# Patient Record
Sex: FEMALE | URBAN - METROPOLITAN AREA
[De-identification: names, ages, dates, MRNs, and addresses within clinical notes are randomized per-mention and may not be internally consistent; named-entity substitution may affect disease eponyms.]

---

## 2019-06-04 ENCOUNTER — APPOINTMENT (RX ONLY)
Dept: URBAN - METROPOLITAN AREA CLINIC 23 | Facility: CLINIC | Age: 39
Setting detail: DERMATOLOGY
End: 2019-06-04

## 2019-06-04 DIAGNOSIS — Z41.9 ENCOUNTER FOR PROCEDURE FOR PURPOSES OTHER THAN REMEDYING HEALTH STATE, UNSPECIFIED: ICD-10-CM

## 2019-06-04 PROCEDURE — ? COSMETIC CONSULTATION: COOLSCULPTING

## 2019-06-11 ENCOUNTER — APPOINTMENT (RX ONLY)
Dept: URBAN - METROPOLITAN AREA CLINIC 23 | Facility: CLINIC | Age: 39
Setting detail: DERMATOLOGY
End: 2019-06-11

## 2019-06-11 DIAGNOSIS — Z41.9 ENCOUNTER FOR PROCEDURE FOR PURPOSES OTHER THAN REMEDYING HEALTH STATE, UNSPECIFIED: ICD-10-CM

## 2019-06-11 PROCEDURE — ? COOLSCULPTING

## 2019-06-11 NOTE — PROCEDURE: COOLSCULPTING
Detail Level: Zone
Consent: Written consent obtained, risks reviewed including, but not limited to, blistering from suction, darker or lighter pigmentary change, bruising, and/or need for multiple treatments.
Applicator Size: [address]
Suction Settings: The suction settings were per protocol.
Applicator Size: [employer]Advantage Curve
Applicator Size: CoolAdvantage Petite
Intro: Prior to treatment, the area was cleaned with alcohol and marked out with a marking pen. The gel sheet was then applied uniformly. The applicator was applied to the skin with good contact and suction.
Applicator Size: CoolAdvantage Petite Core
Location 1: flank (right)
Location 2: lower abdomen (left)
Time (Minutes - Will Only Render If Nonzero): [address] [employer] [address]
Location 3: flank (left)
Time (Minutes - Will Only Render If Nonzero): 35
Device: Coolsculpting
Location 6: lower abdomen (right)
Post Treatment: After treatment, the suction was turned off, and the applicator was removed from the skin.
Location 4: upper abdomen (right)
Location 5: upper abdomen (left)
Patient Weight (Optional): [address]

## 2019-06-12 ENCOUNTER — APPOINTMENT (RX ONLY)
Dept: URBAN - METROPOLITAN AREA CLINIC 23 | Facility: CLINIC | Age: 39
Setting detail: DERMATOLOGY
End: 2019-06-12

## 2019-06-12 DIAGNOSIS — Z41.9 ENCOUNTER FOR PROCEDURE FOR PURPOSES OTHER THAN REMEDYING HEALTH STATE, UNSPECIFIED: ICD-10-CM

## 2019-06-12 PROCEDURE — ? VELASHAPE

## 2019-06-12 NOTE — PROCEDURE: VELASHAPE
Post-Care Instructions: I reviewed with the patient in detail post-care instructions. Patient should stay away from the sun and wear sun protection until treated areas are fully healed.
Anesthesia Volume In Cc: 0
Pre-Procedure Text: The treatment areas were then cleaned and a coupling gel was applied.
Pre-Procedures Photographs: Yes
Use Distraction Techniques In Note: No
Mode Used: Lumiy
Treatment Area: back of thighs
Anesthesia Type: 1% lidocaine with epinephrine
Post-Procedure: Procedure not performed because velashape machine was not operating properly. Yuni Mcgrath
Detail Level: Zone
Consent: Written consent obtained, risks reviewed including but not limited to crusting, scabbing, blistering, scarring, darker or lighter pigmentary change, and/or incomplete improvement.
Treatment Number (Optional): 1

## 2019-06-18 ENCOUNTER — APPOINTMENT (RX ONLY)
Dept: URBAN - METROPOLITAN AREA CLINIC 23 | Facility: CLINIC | Age: 39
Setting detail: DERMATOLOGY
End: 2019-06-18

## 2019-06-18 DIAGNOSIS — Z41.9 ENCOUNTER FOR PROCEDURE FOR PURPOSES OTHER THAN REMEDYING HEALTH STATE, UNSPECIFIED: ICD-10-CM

## 2019-06-18 PROCEDURE — ? VELASHAPE

## 2019-06-18 NOTE — PROCEDURE: VELASHAPE
Anesthesia Type: 1% lidocaine with epinephrine
Pre-Procedure Text: The treatment areas were then cleaned and a coupling gel was applied.
Treatment Area: back of thighs
Use Distraction Techniques In Note: No
Pre-Procedures Photographs: Yes
Post-Procedure: Procedure not performed because velashape machine was not operating properly. Kristyn Hurt
Detail Level: Zone
Treatment Number (Optional): 2
Mode Used: [employer]
Consent: Written consent obtained, risks reviewed including but not limited to crusting, scabbing, blistering, scarring, darker or lighter pigmentary change, and/or incomplete improvement.
Anesthesia Volume In Cc: 0
Post-Care Instructions: I reviewed with the patient in detail post-care instructions. Patient should stay away from the sun and wear sun protection until treated areas are fully healed.

## 2019-07-03 ENCOUNTER — APPOINTMENT (RX ONLY)
Dept: URBAN - METROPOLITAN AREA CLINIC 23 | Facility: CLINIC | Age: 39
Setting detail: DERMATOLOGY
End: 2019-07-03

## 2019-07-03 DIAGNOSIS — Z41.9 ENCOUNTER FOR PROCEDURE FOR PURPOSES OTHER THAN REMEDYING HEALTH STATE, UNSPECIFIED: ICD-10-CM

## 2019-07-03 PROCEDURE — ? VELASHAPE

## 2019-07-03 NOTE — PROCEDURE: VELASHAPE
Post-Procedure: Procedure not performed because velashape machine was not operating properly. Gianni Prudent
Mode Used: [employer]
Use Distraction Techniques In Note: No
Consent: Written consent obtained, risks reviewed including but not limited to crusting, scabbing, blistering, scarring, darker or lighter pigmentary change, and/or incomplete improvement.
Post-Care Instructions: I reviewed with the patient in detail post-care instructions. Patient should stay away from the sun and wear sun protection until treated areas are fully healed.
Detail Level: Zone
Treatment Area: back of thighs
Pre-Procedure Text: The treatment areas were then cleaned and a coupling gel was applied.
Anesthesia Volume In Cc: 0
Pre-Procedures Photographs: Yes
Treatment Number (Optional): 3
Anesthesia Type: 1% lidocaine with epinephrine

## 2019-07-09 ENCOUNTER — APPOINTMENT (RX ONLY)
Dept: URBAN - METROPOLITAN AREA CLINIC 23 | Facility: CLINIC | Age: 39
Setting detail: DERMATOLOGY
End: 2019-07-09

## 2019-07-09 DIAGNOSIS — Z41.9 ENCOUNTER FOR PROCEDURE FOR PURPOSES OTHER THAN REMEDYING HEALTH STATE, UNSPECIFIED: ICD-10-CM

## 2019-07-09 PROCEDURE — ? VELASHAPE

## 2019-07-09 NOTE — PROCEDURE: VELASHAPE
Detail Level: Zone
Pre-Procedure Text: The treatment areas were then cleaned and a coupling gel was applied.
Post-Care Instructions: I reviewed with the patient in detail post-care instructions. Patient should stay away from the sun and wear sun protection until treated areas are fully healed.
Treatment Area: back of thighs
Post-Procedures Photographs: No
Mode Used: [employer]
Anesthesia Volume In Cc: 0
Post-Procedure: Procedure not performed because velashape machine was not operating properly. Chito Malik
Treatment Number (Optional): 4
Pre-Procedures Photographs: Yes
Anesthesia Type: 1% lidocaine with epinephrine
Consent: Written consent obtained, risks reviewed including but not limited to crusting, scabbing, blistering, scarring, darker or lighter pigmentary change, and/or incomplete improvement.

## 2019-07-15 ENCOUNTER — APPOINTMENT (RX ONLY)
Dept: URBAN - METROPOLITAN AREA CLINIC 23 | Facility: CLINIC | Age: 39
Setting detail: DERMATOLOGY
End: 2019-07-15

## 2019-07-15 DIAGNOSIS — Z41.9 ENCOUNTER FOR PROCEDURE FOR PURPOSES OTHER THAN REMEDYING HEALTH STATE, UNSPECIFIED: ICD-10-CM

## 2019-07-15 PROCEDURE — ? VELASHAPE

## 2019-07-15 PROCEDURE — ? ULTRASHAPE

## 2019-07-15 NOTE — PROCEDURE: ULTRASHAPE
Anesthesia Type: 1% lidocaine with epinephrine
Post-Care Instructions: I reviewed with the patient in detail post-care instructions. Patient should stay away from the sun and wear sun protection until treated areas are fully healed.
Post-Procedures Photographs: No
Treatment Area: abdomen
Detail Level: Zone
Consent: Written consent obtained, risks reviewed including but not limited to crusting, scabbing, blistering, scarring, darker or lighter pigmentary change, and/or incomplete improvement.
Focal Treatment Zones (Optional): 5
Anesthesia Volume In Cc: 0
Post-Procedure: The patient tolerated the procedure well and post-care was reviewed.
Focal Treatment Zones (Optional): 6
Pre-Procedures Photographs: Yes
Pre-Procedure Text: The treatment areas were then cleaned and a coupling gel was applied.
Treatment Number (Optional): 1

## 2019-07-15 NOTE — PROCEDURE: VELASHAPE
Mode Used: [employer]
Post-Procedure: Procedure not performed because velashape machine was not operating properly. Kristyn Hurt
Treatment Number (Optional): 1
Pre-Procedures Photographs: Yes
Use Distraction Techniques In Note: No
Pre-Procedure Text: The treatment areas were then cleaned and a coupling gel was applied.
Anesthesia Type: 1% lidocaine with epinephrine
Detail Level: Zone
Post-Care Instructions: I reviewed with the patient in detail post-care instructions. Patient should stay away from the sun and wear sun protection until treated areas are fully healed.
Treatment Area: abdomen
Consent: Written consent obtained, risks reviewed including but not limited to crusting, scabbing, blistering, scarring, darker or lighter pigmentary change, and/or incomplete improvement.
Anesthesia Volume In Cc: 0

## 2019-07-17 ENCOUNTER — APPOINTMENT (RX ONLY)
Dept: URBAN - METROPOLITAN AREA CLINIC 23 | Facility: CLINIC | Age: 39
Setting detail: DERMATOLOGY
End: 2019-07-17

## 2019-07-17 DIAGNOSIS — Z41.9 ENCOUNTER FOR PROCEDURE FOR PURPOSES OTHER THAN REMEDYING HEALTH STATE, UNSPECIFIED: ICD-10-CM

## 2019-07-17 PROCEDURE — ? VELASHAPE

## 2019-07-17 NOTE — PROCEDURE: VELASHAPE
Mode Used: [employer]
Treatment Number (Optional): 2
Post-Care Instructions: I reviewed with the patient in detail post-care instructions. Patient should stay away from the sun and wear sun protection until treated areas are fully healed.
Pre-Procedures Photographs: Yes
Pre-Procedure Text: The treatment areas were then cleaned and a coupling gel was applied.
Anesthesia Volume In Cc: 0
Use Distraction Techniques In Note: No
Treatment Area: back of thighs
Detail Level: Zone
Post-Procedure: Procedure not performed because velashape machine was not operating properly. Natalie Marion
Anesthesia Type: 1% lidocaine with epinephrine
Consent: Written consent obtained, risks reviewed including but not limited to crusting, scabbing, blistering, scarring, darker or lighter pigmentary change, and/or incomplete improvement.

## 2019-07-23 ENCOUNTER — APPOINTMENT (RX ONLY)
Dept: URBAN - METROPOLITAN AREA CLINIC 23 | Facility: CLINIC | Age: 39
Setting detail: DERMATOLOGY
End: 2019-07-23

## 2019-07-23 DIAGNOSIS — Z41.9 ENCOUNTER FOR PROCEDURE FOR PURPOSES OTHER THAN REMEDYING HEALTH STATE, UNSPECIFIED: ICD-10-CM

## 2019-07-23 PROCEDURE — ? VELASHAPE

## 2019-07-23 NOTE — PROCEDURE: VELASHAPE
Consent: Written consent obtained, risks reviewed including but not limited to crusting, scabbing, blistering, scarring, darker or lighter pigmentary change, and/or incomplete improvement.
Anesthesia Volume In Cc: 0
Pre-Procedures Photographs: Yes
Treatment Area: back of thighs
Post-Procedures Photographs: No
Post-Care Instructions: I reviewed with the patient in detail post-care instructions. Patient should stay away from the sun and wear sun protection until treated areas are fully healed.
Anesthesia Type: 1% lidocaine with epinephrine
Pre-Procedure Text: The treatment areas were then cleaned and a coupling gel was applied.
Mode Used: [employer]
Detail Level: Zone
Treatment Number (Optional): 3
Post-Procedure: Procedure not performed because velashape machine was not operating properly. Reji Collins

## 2019-07-30 ENCOUNTER — APPOINTMENT (RX ONLY)
Dept: URBAN - METROPOLITAN AREA CLINIC 23 | Facility: CLINIC | Age: 39
Setting detail: DERMATOLOGY
End: 2019-07-30

## 2019-07-30 DIAGNOSIS — Z41.9 ENCOUNTER FOR PROCEDURE FOR PURPOSES OTHER THAN REMEDYING HEALTH STATE, UNSPECIFIED: ICD-10-CM

## 2019-07-30 PROCEDURE — ? ULTRASHAPE

## 2019-07-30 PROCEDURE — ? VELASHAPE

## 2019-07-30 NOTE — PROCEDURE: VELASHAPE
Consent: Written consent obtained, risks reviewed including but not limited to crusting, scabbing, blistering, scarring, darker or lighter pigmentary change, and/or incomplete improvement.
Mode Used: [employer]
Treatment Number (Optional): 4
Post-Procedures Photographs: No
Anesthesia Volume In Cc: 0
Post-Care Instructions: I reviewed with the patient in detail post-care instructions. Patient should stay away from the sun and wear sun protection until treated areas are fully healed.
Pre-Procedures Photographs: Yes
Pre-Procedure Text: The treatment areas were then cleaned and a coupling gel was applied.
Treatment Area: back of thighs
Detail Level: Zone
Anesthesia Type: 1% lidocaine with epinephrine
Treatment Area: abdomen
Post-Procedure: Procedure not performed because velashape machine was not operating properly. Leia Leija
Treatment Number (Optional): 2

## 2019-07-30 NOTE — PROCEDURE: ULTRASHAPE
Post-Procedure: The patient tolerated the procedure well and post-care was reviewed.
Focal Treatment Zones (Optional): 4
Detail Level: Zone
Treatment Area: abdomen
Anesthesia Type: 1% lidocaine with epinephrine
Consent: Written consent obtained, risks reviewed including but not limited to crusting, scabbing, blistering, scarring, darker or lighter pigmentary change, and/or incomplete improvement.
Treatment Number (Optional): 2
Post-Care Instructions: I reviewed with the patient in detail post-care instructions. Patient should stay away from the sun and wear sun protection until treated areas are fully healed.
Post-Procedures Photographs: No
Pre-Procedure Text: The treatment areas were then cleaned and a coupling gel was applied.
Focal Treatment Zones (Optional): 6
Anesthesia Volume In Cc: 0
Pre-Procedures Photographs: Yes

## 2019-08-19 ENCOUNTER — APPOINTMENT (RX ONLY)
Dept: URBAN - METROPOLITAN AREA CLINIC 23 | Facility: CLINIC | Age: 39
Setting detail: DERMATOLOGY
End: 2019-08-19

## 2019-08-19 DIAGNOSIS — Z41.9 ENCOUNTER FOR PROCEDURE FOR PURPOSES OTHER THAN REMEDYING HEALTH STATE, UNSPECIFIED: ICD-10-CM

## 2019-08-19 PROCEDURE — ? VELASHAPE

## 2019-08-19 PROCEDURE — ? ULTRASHAPE

## 2019-08-19 PROCEDURE — ? HYDRAFACIAL

## 2019-08-19 ASSESSMENT — LOCATION ZONE DERM: LOCATION ZONE: FACE

## 2019-08-19 ASSESSMENT — LOCATION SIMPLE DESCRIPTION DERM: LOCATION SIMPLE: LEFT CHEEK

## 2019-08-19 ASSESSMENT — LOCATION DETAILED DESCRIPTION DERM: LOCATION DETAILED: LEFT INFERIOR MEDIAL MALAR CHEEK

## 2019-08-19 NOTE — PROCEDURE: HYDRAFACIAL
Treatment Number: 1
Additional Vacuum Pressure (Won't Render If 0): 0
Indication: skin texture
Glycolic Acid %: 7.5%
Detail Level: Zone
Vacuum Pressure: [address]
Number Of Passes: 2
Consent: Written consent obtained, risks reviewed including but not limited to crusting, scabbing, blistering, scarring, darker or lighter pigmentary change, bruising, and/or incomplete response.
Post-Care Instructions: I reviewed with the patient in detail post-care instructions. Patient should stay away from the sun and wear sun protection until treated areas are fully healed.

## 2019-08-19 NOTE — PROCEDURE: ULTRASHAPE
Consent: Written consent obtained, risks reviewed including but not limited to crusting, scabbing, blistering, scarring, darker or lighter pigmentary change, and/or incomplete improvement.
Detail Level: Zone
Post-Care Instructions: I reviewed with the patient in detail post-care instructions. Patient should stay away from the sun and wear sun protection until treated areas are fully healed.
Focal Treatment Zones (Optional): 6
Treatment Number (Optional): 3
Anesthesia Type: 1% lidocaine with epinephrine
Post-Procedures Photographs: No
Pre-Procedures Photographs: Yes
Pre-Procedure Text: The treatment areas were then cleaned and a coupling gel was applied.
Focal Treatment Zones (Optional): 4
Post-Procedure: The patient tolerated the procedure well and post-care was reviewed.
Treatment Area: abdomen
Anesthesia Volume In Cc: 0

## 2019-08-19 NOTE — PROCEDURE: VELASHAPE
Pre-Procedure Text: The treatment areas were then cleaned and a coupling gel was applied.
Post-Procedures Photographs: No
Treatment Area: abdomen and thighs
Detail Level: Zone
Mode Used: [employer]
Anesthesia Volume In Cc: 0
Post-Procedure: Procedure not performed because velashape machine was not operating properly. Juana Miller
Consent: Written consent obtained, risks reviewed including but not limited to crusting, scabbing, blistering, scarring, darker or lighter pigmentary change, and/or incomplete improvement.
Pre-Procedures Photographs: Yes
Anesthesia Type: 1% lidocaine with epinephrine
Post-Care Instructions: I reviewed with the patient in detail post-care instructions. Patient should stay away from the sun and wear sun protection until treated areas are fully healed.

## 2019-09-23 ENCOUNTER — APPOINTMENT (RX ONLY)
Dept: URBAN - METROPOLITAN AREA CLINIC 23 | Facility: CLINIC | Age: 39
Setting detail: DERMATOLOGY
End: 2019-09-23

## 2019-09-23 DIAGNOSIS — Z41.9 ENCOUNTER FOR PROCEDURE FOR PURPOSES OTHER THAN REMEDYING HEALTH STATE, UNSPECIFIED: ICD-10-CM

## 2019-09-23 PROCEDURE — ? HYDRAFACIAL

## 2019-09-23 ASSESSMENT — LOCATION ZONE DERM: LOCATION ZONE: FACE

## 2019-09-23 ASSESSMENT — LOCATION SIMPLE DESCRIPTION DERM: LOCATION SIMPLE: LEFT CHEEK

## 2019-09-23 ASSESSMENT — LOCATION DETAILED DESCRIPTION DERM: LOCATION DETAILED: LEFT MEDIAL MALAR CHEEK

## 2019-09-23 NOTE — PROCEDURE: HYDRAFACIAL
Additional Vacuum Pressure (Won't Render If 0): 0
Consent: Written consent obtained, risks reviewed including but not limited to crusting, scabbing, blistering, scarring, darker or lighter pigmentary change, bruising, and/or incomplete response.
Detail Level: Zone
Glycolic Acid %: 7.5%
Vacuum Pressure: [address]
Indication: skin texture
Post-Care Instructions: I reviewed with the patient in detail post-care instructions. Patient should stay away from the sun and wear sun protection until treated areas are fully healed.
Number Of Passes: 2

## 2019-11-15 ENCOUNTER — APPOINTMENT (RX ONLY)
Dept: URBAN - METROPOLITAN AREA CLINIC 23 | Facility: CLINIC | Age: 39
Setting detail: DERMATOLOGY
End: 2019-11-15

## 2019-11-15 DIAGNOSIS — Z41.9 ENCOUNTER FOR PROCEDURE FOR PURPOSES OTHER THAN REMEDYING HEALTH STATE, UNSPECIFIED: ICD-10-CM

## 2019-11-15 PROCEDURE — ? HYDRAFACIAL

## 2019-11-15 ASSESSMENT — LOCATION ZONE DERM: LOCATION ZONE: FACE

## 2019-11-15 ASSESSMENT — LOCATION SIMPLE DESCRIPTION DERM: LOCATION SIMPLE: LEFT CHEEK

## 2019-11-15 ASSESSMENT — LOCATION DETAILED DESCRIPTION DERM: LOCATION DETAILED: LEFT INFERIOR CENTRAL MALAR CHEEK

## 2019-11-15 NOTE — PROCEDURE: HYDRAFACIAL
Consent: Written consent obtained, risks reviewed including but not limited to crusting, scabbing, blistering, scarring, darker or lighter pigmentary change, bruising, and/or incomplete response.
Number Of Passes: 2
Indication: skin texture
Post-Care Instructions: I reviewed with the patient in detail post-care instructions. Patient should stay away from the sun and wear sun protection until treated areas are fully healed.
Detail Level: Zone
Vacuum Pressure: [address]
Additional Vacuum Pressure (Won't Render If 0): 0
Glycolic Acid %: 7.5%

## 2020-08-03 ENCOUNTER — APPOINTMENT (RX ONLY)
Dept: URBAN - METROPOLITAN AREA CLINIC 23 | Facility: CLINIC | Age: 40
Setting detail: DERMATOLOGY
End: 2020-08-03

## 2020-08-03 DIAGNOSIS — Z41.9 ENCOUNTER FOR PROCEDURE FOR PURPOSES OTHER THAN REMEDYING HEALTH STATE, UNSPECIFIED: ICD-10-CM

## 2020-08-03 PROCEDURE — ? RECOMMENDATIONS

## 2020-08-03 NOTE — PROCEDURE: RECOMMENDATIONS
Recommendation Preamble: The following recommendations were made during the visit:
Detail Level: Detailed
Recommendations (Free Text): Ear Lobe  repair I referred her Dr. Shine Mohave quoted pt $350 \\nI explain to pt she will have stitches and canât lopes her for 3 months.

## 2023-06-26 ENCOUNTER — APPOINTMENT (RX ONLY)
Dept: URBAN - METROPOLITAN AREA CLINIC 15 | Facility: CLINIC | Age: 43
Setting detail: DERMATOLOGY
End: 2023-06-26

## 2023-06-26 VITALS — HEIGHT: 63 IN | WEIGHT: 123 LBS

## 2023-06-26 DIAGNOSIS — L71.8 OTHER ROSACEA: ICD-10-CM

## 2023-06-26 DIAGNOSIS — L30.9 DERMATITIS, UNSPECIFIED: ICD-10-CM

## 2023-06-26 PROCEDURE — 99204 OFFICE O/P NEW MOD 45 MIN: CPT

## 2023-06-26 PROCEDURE — ? COUNSELING

## 2023-06-26 PROCEDURE — ? PRESCRIPTION MEDICATION MANAGEMENT

## 2023-06-26 PROCEDURE — ? PRESCRIPTION

## 2023-06-26 RX ORDER — METRONIDAZOLE 7.5 MG/G
1 GEL TOPICAL BID
Qty: 45 | Refills: 1 | Status: ERX | COMMUNITY
Start: 2023-06-26

## 2023-06-26 RX ORDER — HYDROCORTISONE 25 MG/G
1 CREAM TOPICAL BID
Qty: 60 | Refills: 1 | Status: ERX | COMMUNITY
Start: 2023-06-26

## 2023-06-26 RX ADMIN — HYDROCORTISONE 1: 25 CREAM TOPICAL at 00:00

## 2023-06-26 RX ADMIN — METRONIDAZOLE 1: 7.5 GEL TOPICAL at 00:00

## 2023-06-26 ASSESSMENT — LOCATION DETAILED DESCRIPTION DERM
LOCATION DETAILED: RIGHT INFERIOR MEDIAL MALAR CHEEK
LOCATION DETAILED: RIGHT INFERIOR CENTRAL MALAR CHEEK
LOCATION DETAILED: LEFT INFERIOR CENTRAL MALAR CHEEK
LOCATION DETAILED: LEFT INFERIOR MEDIAL MALAR CHEEK

## 2023-06-26 ASSESSMENT — ITCH NUMERIC RATING SCALE: HOW SEVERE IS YOUR ITCHING?: 3

## 2023-06-26 ASSESSMENT — BSA RASH: BSA RASH: 1

## 2023-06-26 ASSESSMENT — LOCATION SIMPLE DESCRIPTION DERM
LOCATION SIMPLE: RIGHT CHEEK
LOCATION SIMPLE: LEFT CHEEK

## 2023-06-26 ASSESSMENT — LOCATION ZONE DERM: LOCATION ZONE: FACE

## 2023-06-26 NOTE — PROCEDURE: PRESCRIPTION MEDICATION MANAGEMENT
Initiate Treatment: .\\n\\nmetronidazole 0.75 % topical gel Apply a thin layer to the entire face twice daily.
Detail Level: Simple
Render In Strict Bullet Format?: No
Initiate Treatment: .\\nhydrocortisone 2.5 % topical cream apply to affected areas on face twice a day for 1-2 weeks

## 2023-06-26 NOTE — HPI: RASH
How Severe Is Your Rash?: moderate
Is This A New Presentation, Or A Follow-Up?: Rash
Additional History: Pt is in office for rash around her nose that she states is itchy and red. Pt states she believes it could be rosacea.